# Patient Record
Sex: MALE | Race: WHITE | NOT HISPANIC OR LATINO | Employment: OTHER | URBAN - METROPOLITAN AREA
[De-identification: names, ages, dates, MRNs, and addresses within clinical notes are randomized per-mention and may not be internally consistent; named-entity substitution may affect disease eponyms.]

---

## 2024-02-28 ENCOUNTER — HOSPITAL ENCOUNTER (OUTPATIENT)
Dept: CARDIOLOGY | Facility: HOSPITAL | Age: 34
Discharge: HOME | End: 2024-02-28
Payer: MEDICAID

## 2024-02-28 ENCOUNTER — APPOINTMENT (OUTPATIENT)
Dept: RADIOLOGY | Facility: HOSPITAL | Age: 34
End: 2024-02-28
Payer: MEDICAID

## 2024-02-28 ENCOUNTER — APPOINTMENT (OUTPATIENT)
Dept: CARDIOLOGY | Facility: HOSPITAL | Age: 34
End: 2024-02-28
Payer: MEDICAID

## 2024-02-28 ENCOUNTER — HOSPITAL ENCOUNTER (EMERGENCY)
Facility: HOSPITAL | Age: 34
Discharge: HOME | End: 2024-02-28
Attending: EMERGENCY MEDICINE
Payer: MEDICAID

## 2024-02-28 VITALS
WEIGHT: 198 LBS | SYSTOLIC BLOOD PRESSURE: 128 MMHG | OXYGEN SATURATION: 96 % | BODY MASS INDEX: 30.01 KG/M2 | HEART RATE: 95 BPM | HEIGHT: 68 IN | TEMPERATURE: 96.8 F | RESPIRATION RATE: 21 BRPM | DIASTOLIC BLOOD PRESSURE: 80 MMHG

## 2024-02-28 DIAGNOSIS — R06.02 SHORTNESS OF BREATH: ICD-10-CM

## 2024-02-28 DIAGNOSIS — T50.901A ACCIDENTAL DRUG OVERDOSE, INITIAL ENCOUNTER: Primary | ICD-10-CM

## 2024-02-28 LAB
ALBUMIN SERPL BCP-MCNC: 4.6 G/DL (ref 3.4–5)
ALP SERPL-CCNC: 64 U/L (ref 33–120)
ALT SERPL W P-5'-P-CCNC: 29 U/L (ref 10–52)
ANION GAP SERPL CALC-SCNC: 17 MMOL/L (ref 10–20)
APAP SERPL-MCNC: <10 UG/ML
AST SERPL W P-5'-P-CCNC: 35 U/L (ref 9–39)
BASOPHILS # BLD AUTO: 0.03 X10*3/UL (ref 0–0.1)
BASOPHILS NFR BLD AUTO: 0.2 %
BILIRUB SERPL-MCNC: 0.4 MG/DL (ref 0–1.2)
BUN SERPL-MCNC: 21 MG/DL (ref 6–23)
CALCIUM SERPL-MCNC: 9 MG/DL (ref 8.6–10.3)
CHLORIDE SERPL-SCNC: 104 MMOL/L (ref 98–107)
CO2 SERPL-SCNC: 24 MMOL/L (ref 21–32)
CREAT SERPL-MCNC: 1.65 MG/DL (ref 0.5–1.3)
EGFRCR SERPLBLD CKD-EPI 2021: 56 ML/MIN/1.73M*2
EOSINOPHIL # BLD AUTO: 0 X10*3/UL (ref 0–0.7)
EOSINOPHIL NFR BLD AUTO: 0 %
ERYTHROCYTE [DISTWIDTH] IN BLOOD BY AUTOMATED COUNT: 13.4 % (ref 11.5–14.5)
ETHANOL SERPL-MCNC: <10 MG/DL
GLUCOSE SERPL-MCNC: 197 MG/DL (ref 74–99)
HCT VFR BLD AUTO: 48.9 % (ref 41–52)
HGB BLD-MCNC: 16.7 G/DL (ref 13.5–17.5)
IMM GRANULOCYTES # BLD AUTO: 0.44 X10*3/UL (ref 0–0.7)
IMM GRANULOCYTES NFR BLD AUTO: 3.4 % (ref 0–0.9)
LYMPHOCYTES # BLD AUTO: 0.53 X10*3/UL (ref 1.2–4.8)
LYMPHOCYTES NFR BLD AUTO: 4.1 %
MAGNESIUM SERPL-MCNC: 2.33 MG/DL (ref 1.6–2.4)
MCH RBC QN AUTO: 32.5 PG (ref 26–34)
MCHC RBC AUTO-ENTMCNC: 34.2 G/DL (ref 32–36)
MCV RBC AUTO: 95 FL (ref 80–100)
MONOCYTES # BLD AUTO: 0.12 X10*3/UL (ref 0.1–1)
MONOCYTES NFR BLD AUTO: 0.9 %
NEUTROPHILS # BLD AUTO: 11.84 X10*3/UL (ref 1.2–7.7)
NEUTROPHILS NFR BLD AUTO: 91.4 %
NRBC BLD-RTO: 0 /100 WBCS (ref 0–0)
PLATELET # BLD AUTO: 188 X10*3/UL (ref 150–450)
POTASSIUM SERPL-SCNC: 5.4 MMOL/L (ref 3.5–5.3)
PROT SERPL-MCNC: 7.4 G/DL (ref 6.4–8.2)
RBC # BLD AUTO: 5.14 X10*6/UL (ref 4.5–5.9)
SALICYLATES SERPL-MCNC: <3 MG/DL
SODIUM SERPL-SCNC: 140 MMOL/L (ref 136–145)
WBC # BLD AUTO: 13 X10*3/UL (ref 4.4–11.3)

## 2024-02-28 PROCEDURE — 93005 ELECTROCARDIOGRAM TRACING: CPT | Mod: 59

## 2024-02-28 PROCEDURE — 71045 X-RAY EXAM CHEST 1 VIEW: CPT

## 2024-02-28 PROCEDURE — 93005 ELECTROCARDIOGRAM TRACING: CPT

## 2024-02-28 PROCEDURE — 85025 COMPLETE CBC W/AUTO DIFF WBC: CPT | Performed by: EMERGENCY MEDICINE

## 2024-02-28 PROCEDURE — 99283 EMERGENCY DEPT VISIT LOW MDM: CPT | Mod: 25

## 2024-02-28 PROCEDURE — 83735 ASSAY OF MAGNESIUM: CPT | Performed by: EMERGENCY MEDICINE

## 2024-02-28 PROCEDURE — 80053 COMPREHEN METABOLIC PANEL: CPT | Performed by: EMERGENCY MEDICINE

## 2024-02-28 PROCEDURE — 36415 COLL VENOUS BLD VENIPUNCTURE: CPT | Performed by: EMERGENCY MEDICINE

## 2024-02-28 PROCEDURE — 71045 X-RAY EXAM CHEST 1 VIEW: CPT | Performed by: RADIOLOGY

## 2024-02-28 PROCEDURE — 80143 DRUG ASSAY ACETAMINOPHEN: CPT | Performed by: EMERGENCY MEDICINE

## 2024-02-28 RX ORDER — NALOXONE HYDROCHLORIDE 4 MG/.1ML
4 SPRAY NASAL AS NEEDED
Qty: 2 EACH | Refills: 1 | Status: SHIPPED | OUTPATIENT
Start: 2024-02-28

## 2024-02-28 ASSESSMENT — COLUMBIA-SUICIDE SEVERITY RATING SCALE - C-SSRS
6. HAVE YOU EVER DONE ANYTHING, STARTED TO DO ANYTHING, OR PREPARED TO DO ANYTHING TO END YOUR LIFE?: NO
6. HAVE YOU EVER DONE ANYTHING, STARTED TO DO ANYTHING, OR PREPARED TO DO ANYTHING TO END YOUR LIFE?: NO
1. SINCE LAST CONTACT, HAVE YOU WISHED YOU WERE DEAD OR WISHED YOU COULD GO TO SLEEP AND NOT WAKE UP?: NO
1. IN THE PAST MONTH, HAVE YOU WISHED YOU WERE DEAD OR WISHED YOU COULD GO TO SLEEP AND NOT WAKE UP?: NO
2. HAVE YOU ACTUALLY HAD ANY THOUGHTS OF KILLING YOURSELF?: NO
2. HAVE YOU ACTUALLY HAD ANY THOUGHTS OF KILLING YOURSELF?: NO

## 2024-02-28 ASSESSMENT — PAIN - FUNCTIONAL ASSESSMENT: PAIN_FUNCTIONAL_ASSESSMENT: 0-10

## 2024-02-28 ASSESSMENT — LIFESTYLE VARIABLES
EVER FELT BAD OR GUILTY ABOUT YOUR DRINKING: NO
HAVE PEOPLE ANNOYED YOU BY CRITICIZING YOUR DRINKING: NO
HAVE YOU EVER FELT YOU SHOULD CUT DOWN ON YOUR DRINKING: NO
EVER HAD A DRINK FIRST THING IN THE MORNING TO STEADY YOUR NERVES TO GET RID OF A HANGOVER: NO

## 2024-02-28 NOTE — ED PROVIDER NOTES
HPI   Chief Complaint   Patient presents with    Drug Overdose       Patient is a 33-year-old male, medical history significant for substance abuse that presents to the emergency department with accidental overdose.  Patient states that he does snort opiates.  Today, he was found apneic and unresponsive.  Family had apparently given him 8 mg of intranasal Narcan when EMS was in route.  Police arrived on scene and did another 8 mg.  By the time patient arrived here, he is awake and alert.  He denies any complaints.  He states that this was not an attempt at self-harm.  He does not think he had any coingestions.  He does have a history of prior overdose.                          Jose Coma Scale Score: 14                     Patient History   Past Medical History:   Diagnosis Date    Hemorrhage, not elsewhere classified 08/29/2013    Ecchymosis    Pain in unspecified hand 07/05/2013    Pain in hand    Personal history of other mental and behavioral disorders     History of attention deficit hyperactivity disorder (ADHD)    Unspecified open wound of other part of head, initial encounter 08/29/2013    Open wound of face    Unspecified open wound of unspecified forearm, initial encounter 08/29/2013    Open wound of forearm    Unspecified open wound of unspecified forearm, initial encounter 08/29/2013    Open wound of forearm    Unspecified open wound of unspecified hand, initial encounter 12/16/2013    Open wound of hand     Past Surgical History:   Procedure Laterality Date    OTHER SURGICAL HISTORY  07/05/2013    Open Treatment Of Fracture Of One Metacarpal Bone     No family history on file.  Social History     Tobacco Use    Smoking status: Not on file    Smokeless tobacco: Not on file   Substance Use Topics    Alcohol use: Not on file    Drug use: Not on file       Physical Exam   ED Triage Vitals   Temp Pulse Resp BP   -- -- -- --      SpO2 Temp src Heart Rate Source Patient Position   -- -- -- --      BP  Location FiO2 (%)     -- --       Physical Exam  Vitals and nursing note reviewed.   Constitutional:       General: He is not in acute distress.     Appearance: Normal appearance. He is well-developed.   HENT:      Head: Normocephalic and atraumatic.   Eyes:      Extraocular Movements: Extraocular movements intact.      Conjunctiva/sclera: Conjunctivae normal.      Pupils: Pupils are equal, round, and reactive to light.   Cardiovascular:      Rate and Rhythm: Normal rate and regular rhythm.      Heart sounds: No murmur heard.  Pulmonary:      Effort: Pulmonary effort is normal. No respiratory distress.      Breath sounds: Normal breath sounds.   Abdominal:      Palpations: Abdomen is soft.      Tenderness: There is no abdominal tenderness.   Musculoskeletal:         General: No swelling.      Cervical back: Neck supple.   Skin:     General: Skin is warm and dry.      Capillary Refill: Capillary refill takes less than 2 seconds.   Neurological:      General: No focal deficit present.      Mental Status: He is alert and oriented to person, place, and time.   Psychiatric:         Mood and Affect: Mood normal.         ED Course & MDM   ED Course as of 02/28/24 1829 Wed Feb 28, 2024   1655 Patient has mild leukocytosis but no anemia. [MK]   1655 Chest x-ray demonstrates no focal infiltrative process. [MK]   1712 Metabolic panel demonstrates moderate insufficiency.  Potassium elevated but hemolyzed. [MK]   1712 Toxicology panel negative. [MK]      ED Course User Index  [MK] Darien Mckinney MD         Diagnoses as of 02/28/24 1829   Accidental drug overdose, initial encounter       Medical Decision Making  Medical Decision Making: Patient presents to the emergency department after opiate overdose.  He is now awake and alert.  Metabolic workup was pursued.  Patient was kept on the monitor.  X-ray shows no evidence of pulmonary edema.  After an hour, patient has not required any further intervention.  I did discuss  options with him.  The patient is adamantly against being evaluated by [facility].  He was monitored in the emergency department for 2 and half hours.  He continued to required no resuscitation.  He was eating and drinking without issue.  I did ask him again about [facility], the patient still does not want any help.  I will prescribe him Narcan.  I did  him that if he changes his mind to return.    EKG interpreted by myself (ED attending physician): EKG demonstrates sinus rhythm.  Rate of 94.  Normal axis.  No acute ischemia.  No STEMI.    Differential Diagnoses Considered: Accidental overdose, polysubstance abuse    Chronic Medical Conditions Significantly Affecting Care: History of substance abuse    External Records Reviewed: I reviewed recent and relevant outside records including: No recent visit    Independent Interpretation of Studies:  I independently interpreted: Chest x-ray obtained reviewed    Escalation of Care:  Appropriate for outpatient management.  We did discuss recovery services but the patient refused.    Social Determinants of Health Significantly Affecting Care:  No social determinants    Prescription Drug Consideration: None    Diagnostic testing considered: Noncontributory              Procedure  Procedures     Darien Mckinney MD  02/28/24 2432

## 2024-02-29 PROCEDURE — 93005 ELECTROCARDIOGRAM TRACING: CPT

## 2024-03-10 LAB
ATRIAL RATE: 87 BPM
ATRIAL RATE: 94 BPM
P AXIS: 90 DEGREES
P AXIS: 91 DEGREES
PR INTERVAL: 150 MS
PR INTERVAL: 153 MS
Q ONSET: 249 MS
Q ONSET: 249 MS
QRS COUNT: 14 BEATS
QRS COUNT: 15 BEATS
QRS DURATION: 91 MS
QRS DURATION: 98 MS
QT INTERVAL: 349 MS
QT INTERVAL: 365 MS
QTC CALCULATION(BAZETT): 437 MS
QTC CALCULATION(BAZETT): 437 MS
QTC FREDERICIA: 405 MS
QTC FREDERICIA: 411 MS
R AXIS: 86 DEGREES
R AXIS: 89 DEGREES
T AXIS: 36 DEGREES
T AXIS: 9 DEGREES
T OFFSET: 424 MS
T OFFSET: 432 MS
VENTRICULAR RATE: 86 BPM
VENTRICULAR RATE: 94 BPM

## 2024-05-01 ENCOUNTER — HOSPITAL ENCOUNTER (EMERGENCY)
Facility: HOSPITAL | Age: 34
Discharge: HOME | End: 2024-05-01
Attending: STUDENT IN AN ORGANIZED HEALTH CARE EDUCATION/TRAINING PROGRAM
Payer: MEDICAID

## 2024-05-01 VITALS
HEIGHT: 71 IN | HEART RATE: 86 BPM | DIASTOLIC BLOOD PRESSURE: 72 MMHG | BODY MASS INDEX: 23.8 KG/M2 | OXYGEN SATURATION: 95 % | WEIGHT: 170 LBS | SYSTOLIC BLOOD PRESSURE: 145 MMHG | RESPIRATION RATE: 18 BRPM | TEMPERATURE: 96.8 F

## 2024-05-01 DIAGNOSIS — Z00.00 EVALUATION BY MEDICAL SERVICE REQUIRED: Primary | ICD-10-CM

## 2024-05-01 PROCEDURE — 99283 EMERGENCY DEPT VISIT LOW MDM: CPT

## 2024-05-01 ASSESSMENT — LIFESTYLE VARIABLES
HAVE YOU EVER FELT YOU SHOULD CUT DOWN ON YOUR DRINKING: NO
EVER HAD A DRINK FIRST THING IN THE MORNING TO STEADY YOUR NERVES TO GET RID OF A HANGOVER: NO
EVER FELT BAD OR GUILTY ABOUT YOUR DRINKING: NO
HAVE PEOPLE ANNOYED YOU BY CRITICIZING YOUR DRINKING: NO
TOTAL SCORE: 0

## 2024-05-01 ASSESSMENT — PAIN SCALES - GENERAL
PAINLEVEL_OUTOF10: 0 - NO PAIN

## 2024-05-01 ASSESSMENT — PAIN - FUNCTIONAL ASSESSMENT: PAIN_FUNCTIONAL_ASSESSMENT: 0-10

## 2024-05-01 NOTE — ED PROVIDER NOTES
"HPI   No chief complaint on file.      This is a 33-year-old male with past medical history significant for substance abuse presenting to the emergency department for evaluation.  Police had reportedly been called to his mom's house for disturbance.  At that time mom had accused patient of taking drugs today.  Per EMS patient appeared to be \"sweaty and jittery\" so brought him to the emergency department for evaluation.  He otherwise was calm and cooperative with them.  Patient himself denies any symptoms.  He denies any drug use this morning.  States that the house was hot which was causing him to sweat.  He does endorse having gone to a verbal argument at home, stating that his aunt had come over this morning.  States his aunt is very Oriental orthodox and just assumed that he had been taking drugs so started yelling at him.  States he was try to get out of the house when police showed up.  He otherwise denies headaches, vision changes, chest pain, abdominal pain, back pain, urinary symptoms, lower extremity pain or swelling, shortness of breath.        History provided by:  Patient   used: No                        No data recorded                   Patient History   Past Medical History:   Diagnosis Date    Hemorrhage, not elsewhere classified 08/29/2013    Ecchymosis    Pain in unspecified hand 07/05/2013    Pain in hand    Personal history of other mental and behavioral disorders     History of attention deficit hyperactivity disorder (ADHD)    Unspecified open wound of other part of head, initial encounter 08/29/2013    Open wound of face    Unspecified open wound of unspecified forearm, initial encounter 08/29/2013    Open wound of forearm    Unspecified open wound of unspecified forearm, initial encounter 08/29/2013    Open wound of forearm    Unspecified open wound of unspecified hand, initial encounter 12/16/2013    Open wound of hand     Past Surgical History:   Procedure Laterality Date    " OTHER SURGICAL HISTORY  07/05/2013    Open Treatment Of Fracture Of One Metacarpal Bone     No family history on file.  Social History     Tobacco Use    Smoking status: Unknown    Smokeless tobacco: Not on file   Substance Use Topics    Alcohol use: Not on file    Drug use: Not on file       Physical Exam   ED Triage Vitals [05/01/24 1302]   Temperature Heart Rate Respirations BP   36 °C (96.8 °F) (!) 113 18 163/83      Pulse Ox Temp Source Heart Rate Source Patient Position   94 % Temporal Monitor Sitting      BP Location FiO2 (%)     Right arm --       Physical Exam  GEN: well appearing, no acute distress  HEAD: atraumatic  EYES: EOMI, PEERL, no scleral icterus  CVS/CHEST: reg rate, nl rhythm, no murmurs/gallops/rubs  PULM: CTAB b/l no wheezes, crackles, or rhonchi   GI: NT/ND, no masses or organomegaly, soft, no guarding  NEURO:  no focal deficits, no facial asymmetry, moving all extremities, 5/5 Strength in bicep/tricep/hip flexor/plantar flexion. Sensation over these muscle groups intact. Able to complete finger to nose, rapid alternating movements without difficulty. Can ambulate without gait disturbance.  SKIN: warm, dry, no rashes or ulcerations  PSYCH: AAOx3 answers questions appropriately, does not appear to be internally stimulated  ED Course & MDM   Diagnoses as of 05/01/24 1309   Evaluation by medical service required       Medical Decision Making  This is a 33-year-old male with past medical history significant for substance abuse presenting to the emergency department for evaluation.  Patient stable upon presentation to the emergency department, no acute distress.  Vitals significant for mild tachycardia improved in the emergency department without intervention.  On exam patient is very well-appearing.  He is speaking in complete sentences.  He is oriented x 4.  No slurred speech.  He is able to ambulate without any gait disturbance.  He does not appear to be clinically intoxicated.  He is denying  any complaints.  He denies any alcohol or drug abuse today.  He does not appear to be internally stimulated in any way. Patient denying any concerns and is requesting to be discharged. At this time no indication for lab work or imaging.  Return precautions discussed and patient discharged in stable condition.    Procedure  Procedures     Mati Sheffield MD  05/03/24 1300

## 2024-08-09 ENCOUNTER — LAB (OUTPATIENT)
Dept: LAB | Facility: LAB | Age: 34
End: 2024-08-09
Payer: MEDICAID

## 2024-08-09 DIAGNOSIS — R53.81 OTHER MALAISE: ICD-10-CM

## 2024-08-09 DIAGNOSIS — F11.20 OPIOID DEPENDENCE, UNCOMPLICATED (MULTI): Primary | ICD-10-CM

## 2024-08-09 LAB
ALBUMIN SERPL BCP-MCNC: 4.3 G/DL (ref 3.4–5)
ALP SERPL-CCNC: 56 U/L (ref 33–120)
ALT SERPL W P-5'-P-CCNC: 10 U/L (ref 10–52)
ANION GAP SERPL CALC-SCNC: 11 MMOL/L (ref 10–20)
AST SERPL W P-5'-P-CCNC: 15 U/L (ref 9–39)
BASOPHILS # BLD AUTO: 0.01 X10*3/UL (ref 0–0.1)
BASOPHILS NFR BLD AUTO: 0.2 %
BILIRUB SERPL-MCNC: 0.5 MG/DL (ref 0–1.2)
BUN SERPL-MCNC: 17 MG/DL (ref 6–23)
CALCIUM SERPL-MCNC: 9.5 MG/DL (ref 8.6–10.3)
CHLORIDE SERPL-SCNC: 105 MMOL/L (ref 98–107)
CO2 SERPL-SCNC: 29 MMOL/L (ref 21–32)
CREAT SERPL-MCNC: 0.92 MG/DL (ref 0.5–1.3)
EGFRCR SERPLBLD CKD-EPI 2021: >90 ML/MIN/1.73M*2
EOSINOPHIL # BLD AUTO: 0.08 X10*3/UL (ref 0–0.7)
EOSINOPHIL NFR BLD AUTO: 1.7 %
ERYTHROCYTE [DISTWIDTH] IN BLOOD BY AUTOMATED COUNT: 13.2 % (ref 11.5–14.5)
GLUCOSE SERPL-MCNC: 87 MG/DL (ref 74–99)
HBV SURFACE AB SER-ACNC: <3.1 MIU/ML
HBV SURFACE AG SERPL QL IA: NONREACTIVE
HCT VFR BLD AUTO: 43.5 % (ref 41–52)
HCV AB SER QL: NONREACTIVE
HGB BLD-MCNC: 14.9 G/DL (ref 13.5–17.5)
HIV 1+2 AB+HIV1 P24 AG SERPL QL IA: NONREACTIVE
IMM GRANULOCYTES # BLD AUTO: 0.01 X10*3/UL (ref 0–0.7)
IMM GRANULOCYTES NFR BLD AUTO: 0.2 % (ref 0–0.9)
LYMPHOCYTES # BLD AUTO: 1.62 X10*3/UL (ref 1.2–4.8)
LYMPHOCYTES NFR BLD AUTO: 33.6 %
MCH RBC QN AUTO: 31.7 PG (ref 26–34)
MCHC RBC AUTO-ENTMCNC: 34.3 G/DL (ref 32–36)
MCV RBC AUTO: 93 FL (ref 80–100)
MONOCYTES # BLD AUTO: 0.36 X10*3/UL (ref 0.1–1)
MONOCYTES NFR BLD AUTO: 7.5 %
NEUTROPHILS # BLD AUTO: 2.74 X10*3/UL (ref 1.2–7.7)
NEUTROPHILS NFR BLD AUTO: 56.8 %
NRBC BLD-RTO: 0 /100 WBCS (ref 0–0)
PLATELET # BLD AUTO: 147 X10*3/UL (ref 150–450)
POTASSIUM SERPL-SCNC: 5 MMOL/L (ref 3.5–5.3)
PROT SERPL-MCNC: 6.5 G/DL (ref 6.4–8.2)
RBC # BLD AUTO: 4.7 X10*6/UL (ref 4.5–5.9)
SODIUM SERPL-SCNC: 140 MMOL/L (ref 136–145)
T4 FREE SERPL-MCNC: 0.98 NG/DL (ref 0.61–1.12)
TREPONEMA PALLIDUM IGG+IGM AB [PRESENCE] IN SERUM OR PLASMA BY IMMUNOASSAY: NONREACTIVE
TSH SERPL-ACNC: 0.36 MIU/L (ref 0.44–3.98)
WBC # BLD AUTO: 4.8 X10*3/UL (ref 4.4–11.3)

## 2024-08-09 PROCEDURE — 87389 HIV-1 AG W/HIV-1&-2 AB AG IA: CPT

## 2024-08-09 PROCEDURE — 36415 COLL VENOUS BLD VENIPUNCTURE: CPT

## 2024-08-09 PROCEDURE — 80053 COMPREHEN METABOLIC PANEL: CPT

## 2024-08-09 PROCEDURE — 84439 ASSAY OF FREE THYROXINE: CPT

## 2024-08-09 PROCEDURE — 85025 COMPLETE CBC W/AUTO DIFF WBC: CPT

## 2024-08-09 PROCEDURE — 86803 HEPATITIS C AB TEST: CPT

## 2024-08-09 PROCEDURE — 86706 HEP B SURFACE ANTIBODY: CPT

## 2024-08-09 PROCEDURE — 84443 ASSAY THYROID STIM HORMONE: CPT

## 2024-08-09 PROCEDURE — 86780 TREPONEMA PALLIDUM: CPT

## 2024-08-09 PROCEDURE — 87340 HEPATITIS B SURFACE AG IA: CPT

## 2024-11-19 ENCOUNTER — HOSPITAL ENCOUNTER (EMERGENCY)
Facility: HOSPITAL | Age: 34
Discharge: HOME | End: 2024-11-19
Payer: MEDICAID

## 2024-11-19 VITALS
SYSTOLIC BLOOD PRESSURE: 140 MMHG | HEART RATE: 77 BPM | WEIGHT: 170 LBS | HEIGHT: 71 IN | TEMPERATURE: 98.1 F | RESPIRATION RATE: 18 BRPM | BODY MASS INDEX: 23.8 KG/M2 | DIASTOLIC BLOOD PRESSURE: 83 MMHG | OXYGEN SATURATION: 96 %

## 2024-11-19 DIAGNOSIS — S01.511A LIP LACERATION, INITIAL ENCOUNTER: Primary | ICD-10-CM
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
## 2024-11-19 DIAGNOSIS — S09.90XA INJURY OF HEAD, INITIAL ENCOUNTER: ICD-10-CM

## 2024-11-19 PROCEDURE — 99283 EMERGENCY DEPT VISIT LOW MDM: CPT | Mod: 25

## 2024-11-19 PROCEDURE — 12011 RPR F/E/E/N/L/M 2.5 CM/<: CPT

## 2024-11-19 PROCEDURE — 12051 INTMD RPR FACE/MM 2.5 CM/<: CPT | Performed by: PHYSICIAN ASSISTANT

## 2024-11-19 RX ORDER — BACITRACIN ZINC 500 UNIT/G
OINTMENT IN PACKET (EA) TOPICAL ONCE
Status: DISCONTINUED | OUTPATIENT
Start: 2024-11-19 | End: 2024-11-19 | Stop reason: HOSPADM

## 2024-11-19 ASSESSMENT — COLUMBIA-SUICIDE SEVERITY RATING SCALE - C-SSRS
6. HAVE YOU EVER DONE ANYTHING, STARTED TO DO ANYTHING, OR PREPARED TO DO ANYTHING TO END YOUR LIFE?: NO
1. IN THE PAST MONTH, HAVE YOU WISHED YOU WERE DEAD OR WISHED YOU COULD GO TO SLEEP AND NOT WAKE UP?: NO
2. HAVE YOU ACTUALLY HAD ANY THOUGHTS OF KILLING YOURSELF?: NO

## 2024-11-19 NOTE — ED TRIAGE NOTES
Secondary to patient volumes and overcrowding, I performed a brief medical screening exam of the patient in triage, as the patient awaits space in the main ED.    History of Present Illness:  Darien Ambrosio presents with   Chief Complaint   Patient presents with    Lip Laceration       Physical Exam:  General - In no acute distress  Respiratory - Breathing comfortably  Cardiac - Normal S1, S2, no m/g/r  Neurologic - Moving all extremities  Head - Laceration to right upper lip through vermilion border.    Medical Decision Making:  Patient will require further evaluation in the main ED.    Initial diagnostic tests were ordered from triage.      The patient demonstrates understanding that this initial evaluation is a brief medical screening exam and the expectation is that they await for space in the main ED to be further evaluated.  The patient understands that, if they leave prior to further evaluation in the main ED after this initial evaluation in triage, they are doing so under their own accord knowing that their evaluation/work-up is not yet complete. The patient also understands that any preliminary diagnostic results, including abnormalities, may not be shared with them, if they choose to leave prior to further evaluation in the main ED.

## 2024-11-19 NOTE — ED PROVIDER NOTES
HPI   Chief Complaint   Patient presents with    Lip Laceration       34-year-old male presents complaining of a lip laceration secondary to work related injury.  The patient states that he was working today as a  when he was accidentally struck in his face.  He states that the impact caused a small laceration to the upper lip.  He states his dentition is intact and denies any loss of consciousness.  He reports that his last tetanus shot has been within the past 5 years.  Denies any vomiting.  Patient is not anticoagulated.  He denies any other injuries and states he has remained ambulatory              Patient History   Past Medical History:   Diagnosis Date    Hemorrhage, not elsewhere classified 08/29/2013    Ecchymosis    Pain in unspecified hand 07/05/2013    Pain in hand    Personal history of other mental and behavioral disorders     History of attention deficit hyperactivity disorder (ADHD)    Unspecified open wound of other part of head, initial encounter 08/29/2013    Open wound of face    Unspecified open wound of unspecified forearm, initial encounter 08/29/2013    Open wound of forearm    Unspecified open wound of unspecified forearm, initial encounter 08/29/2013    Open wound of forearm    Unspecified open wound of unspecified hand, initial encounter 12/16/2013    Open wound of hand     Past Surgical History:   Procedure Laterality Date    OTHER SURGICAL HISTORY  07/05/2013    Open Treatment Of Fracture Of One Metacarpal Bone     No family history on file.  Social History     Tobacco Use    Smoking status: Unknown    Smokeless tobacco: Not on file   Substance Use Topics    Alcohol use: Not Currently    Drug use: Not Currently       Physical Exam   ED Triage Vitals [11/19/24 1720]   Temperature Heart Rate Respirations BP   36.7 °C (98.1 °F) 77 18 140/83      Pulse Ox Temp Source Heart Rate Source Patient Position   96 % Tympanic Monitor Sitting      BP Location FiO2 (%)     Right arm --        Physical Exam  Vitals and nursing note reviewed.   Constitutional:       General: He is not in acute distress.     Appearance: Normal appearance. He is normal weight. He is not ill-appearing.      Comments: GCS of 15   HENT:      Head: Normocephalic.      Mouth/Throat:      Mouth: Mucous membranes are moist.   Eyes:      Extraocular Movements: Extraocular movements intact.      Conjunctiva/sclera: Conjunctivae normal.   Neck:      Comments: No midline cervical spine tenderness  Cardiovascular:      Rate and Rhythm: Normal rate and regular rhythm.      Pulses: Normal pulses.   Pulmonary:      Effort: Pulmonary effort is normal.      Breath sounds: Normal breath sounds.   Musculoskeletal:         General: No tenderness. Normal range of motion.      Cervical back: Normal range of motion and neck supple.   Skin:     General: Skin is warm and dry.      Capillary Refill: Capillary refill takes less than 2 seconds.      Comments: Single linear 1.5 cm laceration to the right lateral aspect of the external portion of the upper lip.  The laceration itself does communicate with the vermilion border.  There is no evidence of an internal lip laceration   Neurological:      Mental Status: He is alert and oriented to person, place, and time.   Psychiatric:         Mood and Affect: Mood normal.         Behavior: Behavior normal.           ED Course & MDM   Diagnoses as of 11/19/24 1802   Lip laceration, initial encounter   Injury of head, initial encounter   Work related injury                 No data recorded     Clarence Coma Scale Score: 15 (11/19/24 1720 : Yazmin Hedrick RN)                           Medical Decision Making  Patient found to be well-appearing on exam.  He has a GCS of 15.  I reviewed the patient's electronic medical records along with his most recent encounters.  In regard to the head injury the patient does not require CT imaging.  Refused to use CT negative.  Do not suspect intracranial hemorrhage or  calvarial fracture.  I also of low suspicion for facial bone fracture.  Patient's dentition is intact.  He was found to have a laceration to the upper lip which communicates with the vermilion border.  There were no internal lip lacerations appreciated on exam.  Patient's wound was cleaned and thoroughly irrigated.  His tetanus status was updated prior to arrival.  Laceration was repaired with sutures.  The patient tolerated the procedure.  He was given wound care instructions along with instructions to help minimize scarring.  Given sutures removed in the next 7 days.  Discussed head injury instructions with the patient and/or family/friend present.  Recommended a trusted person check on the patient several times over the next 24 hours.  Instructed patient and family/friend to return to hospital with increasing headache, repeated vomiting, weakness, clumsiness, drowsiness, or fluid from the nose or ear that might represent a leak of cerebrospinal fluid.  Headache and irritability are common for a day or more after concussion, particularly in children.  Recommended that they not resume normal activity until they are free of headache and dizziness.  Post-concussive syndrome consists of a constellation of symptoms, mainly headache, dizziness, and trouble concentrating, in the days and weeks following concussion.  Encourage close follow-up with his primary care physician for reassessment          Procedure  Laceration Repair    Performed by: Severo Estrada PA-C  Authorized by: Severo Estrada PA-C    Consent:     Consent obtained:  Verbal    Consent given by:  Patient    Risks, benefits, and alternatives were discussed: yes      Risks discussed:  Infection, pain, retained foreign body, need for additional repair, poor cosmetic result, vascular damage and poor wound healing    Alternatives discussed:  No treatment  Universal protocol:     Patient identity confirmed:  Verbally with patient  Anesthesia:     Anesthesia  method:  Local infiltration    Local anesthetic:  Lidocaine 1% w/o epi  Laceration details:     Location:  Lip    Lip location:  Upper exterior lip    Length (cm):  1.5  Pre-procedure details:     Preparation:  Patient was prepped and draped in usual sterile fashion  Exploration:     Hemostasis achieved with:  Direct pressure    Imaging outcome: foreign body not noted      Wound exploration: entire depth of wound visualized      Wound extent: no muscle damage noted      Contaminated: no    Treatment:     Area cleansed with:  Chlorhexidine    Amount of cleaning:  Standard    Irrigation solution:  Sterile water    Irrigation method:  Syringe    Visualized foreign bodies/material removed: no      Debridement:  None  Skin repair:     Repair method:  Sutures    Suture size:  5-0    Suture material:  Prolene    Suture technique:  Simple interrupted    Number of sutures:  3  Approximation:     Approximation:  Close    Vermilion border well-aligned: yes    Repair type:     Repair type:  Intermediate  Post-procedure details:     Dressing:  Antibiotic ointment    Procedure completion:  Tolerated       Severo Estrada PA-C  11/19/24 1800       Severo Estrada PA-C  11/19/24 1809